# Patient Record
Sex: MALE | Race: WHITE | HISPANIC OR LATINO | URBAN - METROPOLITAN AREA
[De-identification: names, ages, dates, MRNs, and addresses within clinical notes are randomized per-mention and may not be internally consistent; named-entity substitution may affect disease eponyms.]

---
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
## 2023-12-06 ENCOUNTER — EMERGENCY (EMERGENCY)
Facility: HOSPITAL | Age: 23
LOS: 1 days | Discharge: HOME | End: 2023-12-06
Attending: INTERNAL MEDICINE | Admitting: STUDENT IN AN ORGANIZED HEALTH CARE EDUCATION/TRAINING PROGRAM
Payer: COMMERCIAL

## 2023-12-06 VITALS
RESPIRATION RATE: 28 BRPM | OXYGEN SATURATION: 98 % | HEIGHT: 67 IN | SYSTOLIC BLOOD PRESSURE: 114 MMHG | TEMPERATURE: 98 F | HEART RATE: 121 BPM | WEIGHT: 199.96 LBS | DIASTOLIC BLOOD PRESSURE: 67 MMHG

## 2023-12-06 LAB
ALBUMIN SERPL ELPH-MCNC: 4.2 G/DL — SIGNIFICANT CHANGE UP (ref 3.3–5)
ALBUMIN SERPL ELPH-MCNC: 4.2 G/DL — SIGNIFICANT CHANGE UP (ref 3.3–5)
ALP SERPL-CCNC: 102 U/L — SIGNIFICANT CHANGE UP (ref 40–120)
ALP SERPL-CCNC: 102 U/L — SIGNIFICANT CHANGE UP (ref 40–120)
ALT FLD-CCNC: 156 U/L — HIGH (ref 10–45)
ALT FLD-CCNC: 156 U/L — HIGH (ref 10–45)
ANION GAP SERPL CALC-SCNC: 10 MMOL/L — SIGNIFICANT CHANGE UP (ref 5–17)
ANION GAP SERPL CALC-SCNC: 10 MMOL/L — SIGNIFICANT CHANGE UP (ref 5–17)
AST SERPL-CCNC: 54 U/L — HIGH (ref 10–40)
AST SERPL-CCNC: 54 U/L — HIGH (ref 10–40)
BASOPHILS # BLD AUTO: 0.03 K/UL — SIGNIFICANT CHANGE UP (ref 0–0.2)
BASOPHILS # BLD AUTO: 0.03 K/UL — SIGNIFICANT CHANGE UP (ref 0–0.2)
BASOPHILS NFR BLD AUTO: 0.2 % — SIGNIFICANT CHANGE UP (ref 0–2)
BASOPHILS NFR BLD AUTO: 0.2 % — SIGNIFICANT CHANGE UP (ref 0–2)
BILIRUB SERPL-MCNC: 1.3 MG/DL — HIGH (ref 0.2–1.2)
BILIRUB SERPL-MCNC: 1.3 MG/DL — HIGH (ref 0.2–1.2)
BUN SERPL-MCNC: 13 MG/DL — SIGNIFICANT CHANGE UP (ref 7–23)
BUN SERPL-MCNC: 13 MG/DL — SIGNIFICANT CHANGE UP (ref 7–23)
CALCIUM SERPL-MCNC: 9.2 MG/DL — SIGNIFICANT CHANGE UP (ref 8.4–10.5)
CALCIUM SERPL-MCNC: 9.2 MG/DL — SIGNIFICANT CHANGE UP (ref 8.4–10.5)
CHLORIDE SERPL-SCNC: 102 MMOL/L — SIGNIFICANT CHANGE UP (ref 96–108)
CHLORIDE SERPL-SCNC: 102 MMOL/L — SIGNIFICANT CHANGE UP (ref 96–108)
CO2 SERPL-SCNC: 27 MMOL/L — SIGNIFICANT CHANGE UP (ref 22–31)
CO2 SERPL-SCNC: 27 MMOL/L — SIGNIFICANT CHANGE UP (ref 22–31)
CREAT SERPL-MCNC: 0.99 MG/DL — SIGNIFICANT CHANGE UP (ref 0.5–1.3)
CREAT SERPL-MCNC: 0.99 MG/DL — SIGNIFICANT CHANGE UP (ref 0.5–1.3)
EGFR: 111 ML/MIN/1.73M2 — SIGNIFICANT CHANGE UP
EGFR: 111 ML/MIN/1.73M2 — SIGNIFICANT CHANGE UP
EOSINOPHIL # BLD AUTO: 0.07 K/UL — SIGNIFICANT CHANGE UP (ref 0–0.5)
EOSINOPHIL # BLD AUTO: 0.07 K/UL — SIGNIFICANT CHANGE UP (ref 0–0.5)
EOSINOPHIL NFR BLD AUTO: 0.5 % — SIGNIFICANT CHANGE UP (ref 0–6)
EOSINOPHIL NFR BLD AUTO: 0.5 % — SIGNIFICANT CHANGE UP (ref 0–6)
GLUCOSE SERPL-MCNC: 111 MG/DL — HIGH (ref 70–99)
GLUCOSE SERPL-MCNC: 111 MG/DL — HIGH (ref 70–99)
HCT VFR BLD CALC: 45.9 % — SIGNIFICANT CHANGE UP (ref 39–50)
HCT VFR BLD CALC: 45.9 % — SIGNIFICANT CHANGE UP (ref 39–50)
HGB BLD-MCNC: 16.3 G/DL — SIGNIFICANT CHANGE UP (ref 13–17)
HGB BLD-MCNC: 16.3 G/DL — SIGNIFICANT CHANGE UP (ref 13–17)
IMM GRANULOCYTES NFR BLD AUTO: 0.5 % — SIGNIFICANT CHANGE UP (ref 0–0.9)
IMM GRANULOCYTES NFR BLD AUTO: 0.5 % — SIGNIFICANT CHANGE UP (ref 0–0.9)
LIDOCAIN IGE QN: 25 U/L — SIGNIFICANT CHANGE UP (ref 16–77)
LIDOCAIN IGE QN: 25 U/L — SIGNIFICANT CHANGE UP (ref 16–77)
LYMPHOCYTES # BLD AUTO: 0.69 K/UL — LOW (ref 1–3.3)
LYMPHOCYTES # BLD AUTO: 0.69 K/UL — LOW (ref 1–3.3)
LYMPHOCYTES # BLD AUTO: 5.3 % — LOW (ref 13–44)
LYMPHOCYTES # BLD AUTO: 5.3 % — LOW (ref 13–44)
MCHC RBC-ENTMCNC: 29.1 PG — SIGNIFICANT CHANGE UP (ref 27–34)
MCHC RBC-ENTMCNC: 29.1 PG — SIGNIFICANT CHANGE UP (ref 27–34)
MCHC RBC-ENTMCNC: 35.5 GM/DL — SIGNIFICANT CHANGE UP (ref 32–36)
MCHC RBC-ENTMCNC: 35.5 GM/DL — SIGNIFICANT CHANGE UP (ref 32–36)
MCV RBC AUTO: 82 FL — SIGNIFICANT CHANGE UP (ref 80–100)
MCV RBC AUTO: 82 FL — SIGNIFICANT CHANGE UP (ref 80–100)
MONOCYTES # BLD AUTO: 0.65 K/UL — SIGNIFICANT CHANGE UP (ref 0–0.9)
MONOCYTES # BLD AUTO: 0.65 K/UL — SIGNIFICANT CHANGE UP (ref 0–0.9)
MONOCYTES NFR BLD AUTO: 5 % — SIGNIFICANT CHANGE UP (ref 2–14)
MONOCYTES NFR BLD AUTO: 5 % — SIGNIFICANT CHANGE UP (ref 2–14)
NEUTROPHILS # BLD AUTO: 11.46 K/UL — HIGH (ref 1.8–7.4)
NEUTROPHILS # BLD AUTO: 11.46 K/UL — HIGH (ref 1.8–7.4)
NEUTROPHILS NFR BLD AUTO: 88.5 % — HIGH (ref 43–77)
NEUTROPHILS NFR BLD AUTO: 88.5 % — HIGH (ref 43–77)
NRBC # BLD: 0 /100 WBCS — SIGNIFICANT CHANGE UP (ref 0–0)
NRBC # BLD: 0 /100 WBCS — SIGNIFICANT CHANGE UP (ref 0–0)
PLATELET # BLD AUTO: 226 K/UL — SIGNIFICANT CHANGE UP (ref 150–400)
PLATELET # BLD AUTO: 226 K/UL — SIGNIFICANT CHANGE UP (ref 150–400)
POTASSIUM SERPL-MCNC: 4 MMOL/L — SIGNIFICANT CHANGE UP (ref 3.5–5.3)
POTASSIUM SERPL-MCNC: 4 MMOL/L — SIGNIFICANT CHANGE UP (ref 3.5–5.3)
POTASSIUM SERPL-SCNC: 4 MMOL/L — SIGNIFICANT CHANGE UP (ref 3.5–5.3)
POTASSIUM SERPL-SCNC: 4 MMOL/L — SIGNIFICANT CHANGE UP (ref 3.5–5.3)
PROT SERPL-MCNC: 8.1 G/DL — SIGNIFICANT CHANGE UP (ref 6–8.3)
PROT SERPL-MCNC: 8.1 G/DL — SIGNIFICANT CHANGE UP (ref 6–8.3)
RAPID RVP RESULT: SIGNIFICANT CHANGE UP
RAPID RVP RESULT: SIGNIFICANT CHANGE UP
RBC # BLD: 5.6 M/UL — SIGNIFICANT CHANGE UP (ref 4.2–5.8)
RBC # BLD: 5.6 M/UL — SIGNIFICANT CHANGE UP (ref 4.2–5.8)
RBC # FLD: 12.1 % — SIGNIFICANT CHANGE UP (ref 10.3–14.5)
RBC # FLD: 12.1 % — SIGNIFICANT CHANGE UP (ref 10.3–14.5)
SARS-COV-2 RNA SPEC QL NAA+PROBE: SIGNIFICANT CHANGE UP
SARS-COV-2 RNA SPEC QL NAA+PROBE: SIGNIFICANT CHANGE UP
SODIUM SERPL-SCNC: 139 MMOL/L — SIGNIFICANT CHANGE UP (ref 135–145)
SODIUM SERPL-SCNC: 139 MMOL/L — SIGNIFICANT CHANGE UP (ref 135–145)
WBC # BLD: 12.97 K/UL — HIGH (ref 3.8–10.5)
WBC # BLD: 12.97 K/UL — HIGH (ref 3.8–10.5)
WBC # FLD AUTO: 12.97 K/UL — HIGH (ref 3.8–10.5)
WBC # FLD AUTO: 12.97 K/UL — HIGH (ref 3.8–10.5)

## 2023-12-06 PROCEDURE — 99203 OFFICE O/P NEW LOW 30 MIN: CPT

## 2023-12-06 PROCEDURE — 74177 CT ABD & PELVIS W/CONTRAST: CPT | Mod: 26,MA

## 2023-12-06 PROCEDURE — 93010 ELECTROCARDIOGRAM REPORT: CPT

## 2023-12-06 PROCEDURE — 76705 ECHO EXAM OF ABDOMEN: CPT | Mod: 26

## 2023-12-06 PROCEDURE — 99222 1ST HOSP IP/OBS MODERATE 55: CPT

## 2023-12-06 PROCEDURE — 99285 EMERGENCY DEPT VISIT HI MDM: CPT

## 2023-12-06 RX ORDER — ONDANSETRON 8 MG/1
4 TABLET, FILM COATED ORAL EVERY 8 HOURS
Refills: 0 | Status: DISCONTINUED | OUTPATIENT
Start: 2023-12-06 | End: 2023-12-09

## 2023-12-06 RX ORDER — SODIUM CHLORIDE 9 MG/ML
1000 INJECTION INTRAMUSCULAR; INTRAVENOUS; SUBCUTANEOUS ONCE
Refills: 0 | Status: COMPLETED | OUTPATIENT
Start: 2023-12-06 | End: 2023-12-06

## 2023-12-06 RX ORDER — ACETAMINOPHEN 500 MG
650 TABLET ORAL EVERY 6 HOURS
Refills: 0 | Status: DISCONTINUED | OUTPATIENT
Start: 2023-12-06 | End: 2023-12-09

## 2023-12-06 RX ORDER — PANTOPRAZOLE SODIUM 20 MG/1
40 TABLET, DELAYED RELEASE ORAL
Refills: 0 | Status: DISCONTINUED | OUTPATIENT
Start: 2023-12-06 | End: 2023-12-09

## 2023-12-06 RX ORDER — PIPERACILLIN AND TAZOBACTAM 4; .5 G/20ML; G/20ML
3.38 INJECTION, POWDER, LYOPHILIZED, FOR SOLUTION INTRAVENOUS ONCE
Refills: 0 | Status: COMPLETED | OUTPATIENT
Start: 2023-12-06 | End: 2023-12-06

## 2023-12-06 RX ORDER — MORPHINE SULFATE 50 MG/1
2 CAPSULE, EXTENDED RELEASE ORAL EVERY 6 HOURS
Refills: 0 | Status: DISCONTINUED | OUTPATIENT
Start: 2023-12-06 | End: 2023-12-06

## 2023-12-06 RX ORDER — PIPERACILLIN AND TAZOBACTAM 4; .5 G/20ML; G/20ML
3.38 INJECTION, POWDER, LYOPHILIZED, FOR SOLUTION INTRAVENOUS EVERY 8 HOURS
Refills: 0 | Status: DISCONTINUED | OUTPATIENT
Start: 2023-12-06 | End: 2023-12-09

## 2023-12-06 RX ORDER — ONDANSETRON 8 MG/1
4 TABLET, FILM COATED ORAL ONCE
Refills: 0 | Status: COMPLETED | OUTPATIENT
Start: 2023-12-06 | End: 2023-12-06

## 2023-12-06 RX ORDER — ACETAMINOPHEN 500 MG
1000 TABLET ORAL ONCE
Refills: 0 | Status: COMPLETED | OUTPATIENT
Start: 2023-12-06 | End: 2023-12-06

## 2023-12-06 RX ORDER — SODIUM CHLORIDE 9 MG/ML
1000 INJECTION, SOLUTION INTRAVENOUS
Refills: 0 | Status: DISCONTINUED | OUTPATIENT
Start: 2023-12-06 | End: 2023-12-06

## 2023-12-06 RX ORDER — INFLUENZA VIRUS VACCINE 15; 15; 15; 15 UG/.5ML; UG/.5ML; UG/.5ML; UG/.5ML
0.5 SUSPENSION INTRAMUSCULAR ONCE
Refills: 0 | Status: DISCONTINUED | OUTPATIENT
Start: 2023-12-06 | End: 2023-12-09

## 2023-12-06 RX ORDER — FAMOTIDINE 10 MG/ML
20 INJECTION INTRAVENOUS ONCE
Refills: 0 | Status: COMPLETED | OUTPATIENT
Start: 2023-12-06 | End: 2023-12-06

## 2023-12-06 RX ADMIN — FAMOTIDINE 20 MILLIGRAM(S): 10 INJECTION INTRAVENOUS at 02:05

## 2023-12-06 RX ADMIN — PIPERACILLIN AND TAZOBACTAM 25 GRAM(S): 4; .5 INJECTION, POWDER, LYOPHILIZED, FOR SOLUTION INTRAVENOUS at 11:32

## 2023-12-06 RX ADMIN — ONDANSETRON 4 MILLIGRAM(S): 8 TABLET, FILM COATED ORAL at 06:32

## 2023-12-06 RX ADMIN — Medication 400 MILLIGRAM(S): at 03:54

## 2023-12-06 RX ADMIN — SODIUM CHLORIDE 100 MILLILITER(S): 9 INJECTION, SOLUTION INTRAVENOUS at 06:04

## 2023-12-06 RX ADMIN — ONDANSETRON 4 MILLIGRAM(S): 8 TABLET, FILM COATED ORAL at 01:34

## 2023-12-06 RX ADMIN — Medication 650 MILLIGRAM(S): at 11:36

## 2023-12-06 RX ADMIN — FAMOTIDINE 100 MILLIGRAM(S): 10 INJECTION INTRAVENOUS at 01:33

## 2023-12-06 RX ADMIN — PANTOPRAZOLE SODIUM 40 MILLIGRAM(S): 20 TABLET, DELAYED RELEASE ORAL at 06:04

## 2023-12-06 RX ADMIN — PIPERACILLIN AND TAZOBACTAM 200 GRAM(S): 4; .5 INJECTION, POWDER, LYOPHILIZED, FOR SOLUTION INTRAVENOUS at 04:36

## 2023-12-06 RX ADMIN — MORPHINE SULFATE 2 MILLIGRAM(S): 50 CAPSULE, EXTENDED RELEASE ORAL at 06:28

## 2023-12-06 RX ADMIN — Medication 1000 MILLIGRAM(S): at 04:09

## 2023-12-06 RX ADMIN — Medication 650 MILLIGRAM(S): at 12:06

## 2023-12-06 RX ADMIN — SODIUM CHLORIDE 1000 MILLILITER(S): 9 INJECTION INTRAMUSCULAR; INTRAVENOUS; SUBCUTANEOUS at 01:33

## 2023-12-06 RX ADMIN — SODIUM CHLORIDE 1000 MILLILITER(S): 9 INJECTION INTRAMUSCULAR; INTRAVENOUS; SUBCUTANEOUS at 02:33

## 2023-12-06 RX ADMIN — MORPHINE SULFATE 2 MILLIGRAM(S): 50 CAPSULE, EXTENDED RELEASE ORAL at 06:58

## 2023-12-06 RX ADMIN — PIPERACILLIN AND TAZOBACTAM 25 GRAM(S): 4; .5 INJECTION, POWDER, LYOPHILIZED, FOR SOLUTION INTRAVENOUS at 19:10

## 2023-12-06 RX ADMIN — Medication 1000 MILLIGRAM(S): at 04:24

## 2023-12-06 RX ADMIN — PIPERACILLIN AND TAZOBACTAM 3.38 GRAM(S): 4; .5 INJECTION, POWDER, LYOPHILIZED, FOR SOLUTION INTRAVENOUS at 05:18

## 2023-12-06 NOTE — CONSULT NOTE ADULT - ASSESSMENT
IMPRESSION: Abdominal pain - etiology unclear    PLAN: Ultrasound to R/O  hepatobiliary tract disease            Recommend empiric antibiotics for possible early, uncomplicated appendicitis            No indication of any surgical problem at this time            Begin PO fluids

## 2023-12-06 NOTE — H&P ADULT - NSHPPHYSICALEXAM_GEN_ALL_CORE
Vital Signs Last 24 Hrs  T(C): 36.7 (06 Dec 2023 05:00), Max: 36.7 (06 Dec 2023 05:00)  T(F): 98 (06 Dec 2023 05:00), Max: 98 (06 Dec 2023 05:00)  HR: 98 (06 Dec 2023 05:00) (98 - 121)  BP: 126/76 (06 Dec 2023 05:00) (114/67 - 126/76)  BP(mean): --  RR: 20 (06 Dec 2023 05:00) (20 - 28)  SpO2: 98% (06 Dec 2023 05:00) (98% - 98%)    Parameters below as of 06 Dec 2023 05:00  Patient On (Oxygen Delivery Method): room air      Daily Height in cm: 170.18 (06 Dec 2023 01:01)    Daily   CAPILLARY BLOOD GLUCOSE        I&O's Summary      GENERAL: NAD  HEAD:  Normocephalic  EYES: EOMI, PERRLA, conjunctiva and sclera clear  ENMT: No tonsillar erythema, exudates, or enlargement; Moist mucous membranes, No lesions  NECK: Supple, No JVD, no bruit, normal thyroid  NERVOUS SYSTEM:  Alert & Oriented X3, Good concentration; grossly  Motor Strength 5/5 B/L upper and lower extremities; DTRs 2+ intact and symmetric  CHEST/LUNG: Clear to auscultation bilaterally; No rales, rhonchi, wheezing, or rubs  HEART: Regular rate and rhythm; No murmurs, rubs, or gallops  ABDOMEN: Soft, epigastric and RUQ tenderness and R LQ tenderness on palp, Nondistended; Bowel sounds present. no ellie or rigidity  EXTREMITIES:  2+ Peripheral Pulses, No clubbing, cyanosis, or edema  LYMPH: No lymphadenopathy noted  SKIN: No rashes or lesions

## 2023-12-06 NOTE — H&P ADULT - NSICDXFAMILYHX_GEN_ALL_CORE_FT
FAMILY HISTORY:  Grandparent  Still living? Unknown  FH: HTN (hypertension), Age at diagnosis: Age Unknown  FHx: diabetes mellitus, Age at diagnosis: Age Unknown

## 2023-12-06 NOTE — ED ADULT NURSE REASSESSMENT NOTE - NS ED NURSE REASSESS COMMENT FT1
Called dietary for tray as patient was upgraded to clear liquid diet. Will try to send a tray or an early lunch tray. Patient ambulated to restroom with steady gait noted. Family at bedside. Needs met, educated to keep arm with continuous fluids straight. Patient given clear liquids that are in ER.

## 2023-12-06 NOTE — ED ADULT NURSE NOTE - CADM POA URETHRAL CATHETER
No
History of prostate cancer diagnosed many years ago on active chemo. Denies RT or surgeries. [facility] Oneil. Based on imaging has diffuse metastatic lesions to the bone.   - He is unaware that it is metastatic ideally should speak to wife for more collateral information before informing the patient  - Should speak to his Oncologist of his current status  - Might be the explanation for his weakness?

## 2023-12-06 NOTE — ED PROVIDER NOTE - NSICDXPASTSURGICALHX_GEN_ALL_CORE_FT
PAST SURGICAL HISTORY:  Circumcision     Inguinal hernia bilateral, non-recurrent repaired at 3 mos of life @ [facility]     PAST SURGICAL HISTORY:  Circumcision     Inguinal hernia bilateral, non-recurrent repaired at 3 mos of life @ [facility]

## 2023-12-06 NOTE — ED ADULT NURSE NOTE - OBJECTIVE STATEMENT
Patient arrives A&OX4 and ambulatory with steady gait. Patient states since 1600, has had N+V+D. Patient states he attempted to rest to help symptoms but no relief. Patient continued to vomit. Patient without fever, chills, sick contact. Abd cramping is diffuse in nature. 20G IV placed to RFA and labs drawn as ordered. MD at bedside for eval. Side rails up, call light in reach, safety maintained Patient arrives A&OX4 and ambulatory with steady gait. Patient states since 1600, has had N+V+D. Patient states he attempted to rest to help symptoms but no relief. Patient continued to vomit. Patient without fever, chills, sick contact. Abd cramping is diffuse in nature. 20G IV placed to RAC and labs drawn as ordered. MD at bedside for eval. Side rails up, call light in reach, safety maintained

## 2023-12-06 NOTE — H&P ADULT - NSHPLABSRESULTS_GEN_ALL_CORE
< from: CT Abdomen and Pelvis w/ IV Cont (12.06.23 @ 03:21) >    FINDINGS:  LOWER CHEST: Within normal limits.    LIVER: Steatosis.  BILE DUCTS: Normal caliber.  GALLBLADDER: Within normal limits.  SPLEEN: Within normal limits.  PANCREAS: Within normal limits.  ADRENALS: Within normal limits.  KIDNEYS/URETERS: Within normal limits.    BLADDER: Within normal limits.  REPRODUCTIVE ORGANS: Normal-sized prostate.    BOWEL: No bowel obstruction. Appendix is slightly prominent up to 7 mm.   No periappendiceal inflammation.  PERITONEUM: No ascites.  VESSELS: Within normal limits.  RETROPERITONEUM/LYMPH NODES: No lymphadenopathy.  ABDOMINAL WALL: Tiny fat-containing umbilical hernia.  BONES: Within normal limits.    IMPRESSION:  No bowel obstruction or evidence of colitis.  Slightly prominent appendix up to 7 mm without associated periappendiceal   inflammation.      < end of copied text >

## 2023-12-06 NOTE — ED PROVIDER NOTE - PHYSICAL EXAMINATION
General:     NAD, well-nourished, well-appearing  Head:     NC/AT, EOMI, oral mucosa moist  Neck:     trachea midline  Lungs:     CTA b/l, no w/r/r  CVS:     S1S2, RRR, no m/g/r  Abd:     + Hyperactive bowel soundsBS, s/Right lower quadrant pain/nd, no organomegaly  Ext:    2+ radial and pedal pulses, no c/c/e  Neuro: AAOx3, no sensory/motor deficits

## 2023-12-06 NOTE — CONSULT NOTE ADULT - SUBJECTIVE AND OBJECTIVE BOX
This 23 year old man developed upper abdominal pain 24 hours ago upon awakening from sleep. He vomited several times as the pain became more generalized   The pain improved only to return several hours later. The patient presented to the ED last night where a CT scan demonstrated an appendix which was at the upper limit of normal diameter (7mm) without any inflammation. The patient experienced similar symptoms one week ago which quickly resolved after vomiting. The patient denied fever or chills.        PAST MEDICAL & SURGICAL HISTORY:  No pertinent past medical history.  No pertinent surgical procedures.    PFSH: All noncontributory    MEDICATIONS REVIEWED:acetaminophen     Tablet .. 650 milliGRAM(s) Oral every 6 hours PRN  lactated ringers. 1000 milliLiter(s) IV Continuous <Continuous>  morphine  - Injectable 2 milliGRAM(s) IV Push every 6 hours PRN  ondansetron Injectable 4 milliGRAM(s) IV Push every 8 hours PRN  pantoprazole    Tablet 40 milliGRAM(s) Oral before breakfast  piperacillin/tazobactam IVPB.. 3.375 Gram(s) IV Intermittent every 8 hours      ALLERGIES:penicillins (Rash)  amoxicillin (Other)      REVIEW OF SYSTEMS:  Comprehensive Review of Systems negative except as noted in HPI      PHYSICAL EXAMINATION:  RESPIRATORY: Clear to auscultation bilaterally, respirations non-labored.  CARDIAC: RR, normal S1S2, no murmurs.  ABDOMEN: Slight tenderness to DEEP palpation RUQ=RLQ=epigastrium, soft, BS present, no masses, no hernias, no peritoneal signs, no KLS,  VASCULAR: Equal and normal pulses.  MUSCULOSKELETAL: Full ROM, no deformities.      T(C): 37.6 (12-06-23 @ 05:45), Max: 37.6 (12-06-23 @ 05:45)  HR: 87 (12-06-23 @ 08:02) (87 - 121)  BP: 114/56 (12-06-23 @ 08:02) (114/56 - 126/76)  RR: 19 (12-06-23 @ 08:02) (19 - 28)  SpO2: 99% (12-06-23 @ 08:02) (97% - 99%)                          16.3   12.97 )-----------( 226      ( 06 Dec 2023 01:30 )             45.9       12-06    139  |  102  |  13  ----------------------------<  111<H>  4.0   |  27  |  0.99    Ca    9.2      06 Dec 2023 01:30    TPro  8.1  /  Alb  4.2  /  TBili  1.3<H>  /  DBili  x   /  AST  54<H>  /  ALT  156<H>  /  AlkPhos  102  12-06

## 2023-12-06 NOTE — H&P ADULT - NSHPREVIEWOFSYSTEMS_GEN_ALL_CORE
CONSTITUTIONAL: No fever, weight loss, or fatigue  EYES: No eye pain, visual disturbances, or discharge  ENMT:  No difficulty hearing, tinnitus, vertigo; No sinus or throat pain  NECK: No pain or stiffness  RESPIRATORY: No cough, wheezing, chills or hemoptysis; No shortness of breath  CARDIOVASCULAR: No chest pain, palpitations, dizziness, or leg swelling  GASTROINTESTINAL: + abdominal or epigastric pain. + nausea, vomiting, no  hematemesis; ++ diarrhea. No melena or hematochezia.  GENITOURINARY: No dysuria, frequency, hematuria, or incontinence  NEUROLOGICAL: No headaches, memory loss, loss of strength, numbness, or tremors  SKIN: No itching, burning, rashes, or lesions   LYMPH NODES: No enlarged glands  ENDOCRINE: No heat or cold intolerance; No hair loss  MUSCULOSKELETAL: No joint pain or swelling; No muscle, back, or extremity pain  PSYCHIATRIC: No depression, anxiety, mood swings, or difficulty sleeping  HEME/LYMPH: No easy bruising, or bleeding gums  ALLERY AND IMMUNOLOGIC: No hives or eczema    IMPROVE VTE Individual Risk Assessment          RISK                                                          Points  [  ] Previous VTE                                                3  [  ] Thrombophilia                                             2  [  ] Lower limb paralysis                                   2        (unable to hold up >15 seconds)    [  ] Current Cancer                                             2         (within 6 months)  [  ] Immobilization > 24 hrs                              1  [  ] ICU/CCU stay > 24 hours                             1  [  ] Age > 60                                                         1    IMPROVE VTE Score:         [    0     ]    Total Risk Factor Score:    0 - 1:   Consider IPC  >2 - 3:  Thromboprophylaxis required (enoxaparin or SQ heparin)        >4:   High Risk: Thromboprophylaxis required (enoxaparin or SQ heparin), optional add IPC  **If CONTRAINDICATION to enoxaparin or SQ heparin, USE IPCs**

## 2023-12-06 NOTE — PATIENT PROFILE ADULT - FUNCTIONAL ASSESSMENT - BASIC MOBILITY 6.
4-calculated by average/Not able to assess (calculate score using [facility] averaging method)  4-calculated by average/Not able to assess (calculate score using [facility] averaging method)

## 2023-12-06 NOTE — ED ADULT NURSE NOTE - NSFALLUNIVINTERV_ED_ALL_ED
[FreeTextEntry1] : CPE [de-identified] : vaccine- got COVID vac\par diet- healthy, WW\par exercise- no since she hurt her knee\par Thyroid CA- being f/u by ENT -5/17/22, reviewed 2/24/22 US thyroid\par constipation- after thyroidectomy- better since taking probiotic- now back to nl\par pt was evaluated by fertility- restarting fertility tx now , endo- being f/u by Dr. Palmer- awaiting implantation after getting nuclear scan\par .  4/19 ECHO EF 55%, mild TR\par chronic back pain- no chg- did PT Bed/Stretcher in lowest position, wheels locked, appropriate side rails in place/Call bell, personal items and telephone in reach/Instruct patient to call for assistance before getting out of bed/chair/stretcher/Non-slip footwear applied when patient is off stretcher/[place] to call system/Physically safe environment - no spills, clutter or unnecessary equipment/Purposeful proactive rounding/Room/bathroom lighting operational, light cord in reach Bed/Stretcher in lowest position, wheels locked, appropriate side rails in place/Call bell, personal items and telephone in reach/Instruct patient to call for assistance before getting out of bed/chair/stretcher/Non-slip footwear applied when patient is off stretcher/[place] to call system/Physically safe environment - no spills, clutter or unnecessary equipment/Purposeful proactive rounding/Room/bathroom lighting operational, light cord in reach

## 2023-12-06 NOTE — PROGRESS NOTE ADULT - SUBJECTIVE AND OBJECTIVE BOX
Patient is a 23y old  Male who presents with a chief complaint of RUQ pain, RLQ pain r/o appy (06 Dec 2023 09:05)      Patient seen and examined at bedside. Admitted last night for abd pain, r/o appendicitis. Patient at this time states abd pain still present but much improved since yesterday. Passed flatus yesterday but not today, no BM yet. Denies nausea or vomiting today. States abd pain more so center location but radiates. Has had GERD in the past but resolves quickly on its own.    ALLERGIES:  penicillins (Rash)  amoxicillin (Other)    MEDICATIONS  (STANDING):  lactated ringers. 1000 milliLiter(s) (100 mL/Hr) IV Continuous <Continuous>  pantoprazole    Tablet 40 milliGRAM(s) Oral before breakfast  piperacillin/tazobactam IVPB.. 3.375 Gram(s) IV Intermittent every 8 hours    MEDICATIONS  (PRN):  acetaminophen     Tablet .. 650 milliGRAM(s) Oral every 6 hours PRN Mild Pain (1 - 3)  morphine  - Injectable 2 milliGRAM(s) IV Push every 6 hours PRN Severe Pain (7 - 10)  ondansetron Injectable 4 milliGRAM(s) IV Push every 8 hours PRN Nausea and/or Vomiting    Vital Signs Last 24 Hrs  T(F): 100 (06 Dec 2023 11:41), Max: 100 (06 Dec 2023 11:18)  HR: 82 (06 Dec 2023 11:18) (82 - 121)  BP: 122/86 (06 Dec 2023 11:18) (114/56 - 126/76)  RR: 18 (06 Dec 2023 11:18) (18 - 28)  SpO2: 99% (06 Dec 2023 11:18) (97% - 99%)  I&O's Summary      PHYSICAL EXAM:  GENERAL: NAD, well-groomed, well-developed  HEAD:  Atraumatic, Normocephalic  EYES: PEERL, conjunctiva and sclera clear  ENMT: Moist mucous membranes, Good dentition, no thrush  NECK: Supple, No JVD  CHEST/LUNG: Clear to auscultation bilaterally, good air entry, non-labored breathing  HEART: RRR; S1/S2, No murmur  ABDOMEN: Soft, epigastric tenderness, mild discomfort to palpation L side, Nondistended; Bowel sounds present  EXTREMITIES: No calf tenderness, No cyanosis, No edema  SKIN: Warm, perfused  PSYCH: Normal mood, Normal affect  NERVOUS SYSTEM:  A/O x3, Good concentration    LABS:                        16.3   12.97 )-----------( 226      ( 06 Dec 2023 01:30 )             45.9     12-06    139  |  102  |  13  ----------------------------<  111  4.0   |  27  |  0.99    Ca    9.2      06 Dec 2023 01:30    TPro  8.1  /  Alb  4.2  /  TBili  1.3  /  DBili  x   /  AST  54  /  ALT  156  /  AlkPhos  102  12-06                                Urinalysis Basic - ( 06 Dec 2023 01:30 )    Color: x / Appearance: x / SG: x / pH: x  Gluc: 111 mg/dL / Ketone: x  / Bili: x / Urobili: x   Blood: x / Protein: x / Nitrite: x   Leuk Esterase: x / RBC: x / WBC x   Sq Epi: x / Non Sq Epi: x / Bacteria: x            RADIOLOGY & ADDITIONAL TESTS:    Care Discussed with Consultants/Other Providers:

## 2023-12-06 NOTE — H&P ADULT - HISTORY OF PRESENT ILLNESS
23 M no sig PMHx other than bl inguinal hernia repair pw abd pain with NVD. Pt related he had woke up yesterday with a mild upper epigastric abd discomfort which lasted briefly. Recurrent abd pain at around 3-4PM with mostly R sided abd pain subsequently followed with several episodes of watery diarrhea at about 6PM and later with vomiting x 3. Unable to tolerate po and increasing abd pain and came to ED. Denied any fevers, chills, URIs, cough, SOB, CP, dysuria or flank pain. +family member with similar sxs about a week ago that resolved after a day. Denied any recent antibx use.   In ED afebrile P: 121 BP: 134/67 sat 98% on RA.   WBC: 12.97 88%N hgb: 16.3  0.99 TB: 1.3 AP; 102 AST/ALT 54/156   CTAP:   < from: CT Abdomen and Pelvis w/ IV Cont (12.06.23 @ 03:21) >  IMPRESSION:  No bowel obstruction or evidence of colitis.  Slightly prominent appendix up to 7 mm without associated periappendiceal   inflammation.    < end of copied text >

## 2023-12-06 NOTE — ED PROVIDER NOTE - OBJECTIVE STATEMENT
23-year-old male came to the emergency room with chief complaint vomiting diarrhea denies any fever patient unable to tolerate p.o.

## 2023-12-06 NOTE — ED PROVIDER NOTE - CLINICAL SUMMARY MEDICAL DECISION MAKING FREE TEXT BOX
23-year-old with acute abdominal pain nausea vomiting tender in the right lower quadrant  Labs and CT IV fluids Pepcid Zofran 23-year-old with acute abdominal pain nausea vomiting tender in the right lower quadrant  Labs and CT IV fluids Pepcid Zofran   Patient's white count is 12.6 CT is consistent with dilated appendix patient continues to have right lower quadrant tenderness message sent to Dr. Soto surgery antibiotics started plan to admit the patient for observation

## 2023-12-06 NOTE — H&P ADULT - NSICDXPASTSURGICALHX_GEN_ALL_CORE_FT
PAST SURGICAL HISTORY:  Circumcision     Inguinal hernia bilateral, non-recurrent repaired at 3 mos of life @ [facility]     PAST SURGICAL HISTORY:  Circumcision     Inguinal hernia bilateral, non-recurrent repaired at 3 mos of life @ [facility]

## 2023-12-06 NOTE — H&P ADULT - ASSESSMENT
23 M no sig PMHx other than bl inguinal hernia repair pw abd pain with NVD.    #Dilated appendix r/o early appendicitis  cont IV Zosyn  Keep NPO  IVFs  anti-emetics.  Surgery consult    #Abn LFTs with RUQ/epigastric tenderness  CTAP neg for GB pathology  Check RUQ sono to be complete    #Diarrhea  check stool cxs, GI pcr, check RVP to be complete.     Grandmother at bedside updated.

## 2023-12-06 NOTE — PATIENT PROFILE ADULT - FALL HARM RISK - UNIVERSAL INTERVENTIONS
Bed in lowest position, wheels locked, appropriate side rails in place/Call bell, personal items and telephone in reach/Instruct patient to call for assistance before getting out of bed or chair/Non-slip footwear when patient is out of bed/[place] to call system/Physically safe environment - no spills, clutter or unnecessary equipment/Purposeful Proactive Rounding/Room/bathroom lighting operational, light cord in reach Bed in lowest position, wheels locked, appropriate side rails in place/Call bell, personal items and telephone in reach/Instruct patient to call for assistance before getting out of bed or chair/Non-slip footwear when patient is out of bed/[place] to call system/Physically safe environment - no spills, clutter or unnecessary equipment/Purposeful Proactive Rounding/Room/bathroom lighting operational, light cord in reach

## 2023-12-06 NOTE — ED ADULT NURSE REASSESSMENT NOTE - NS ED NURSE REASSESS COMMENT FT1
Patient alert ad oriented, breathing spontaneous and unlabored. Reports being tired. Call bell at bedside, needs met at this time. Morning labs drawn. Bed locked and in lowest position for safety. Patient alert and oriented, breathing spontaneous and unlabored. Reports being tired. Call bell at bedside, needs met at this time. Morning labs drawn. Bed locked and in lowest position for safety.

## 2023-12-06 NOTE — ED ADULT NURSE REASSESSMENT NOTE - NS ED NURSE REASSESS COMMENT FT1
Patient endorsing increase in pain at this time. MD Rodriguez made aware and awaiting further orders. Patient endorsing increase in pain at this time. No change in assessment. Vitals as per flowsheet. MD Rodriguez made aware and awaiting further orders.

## 2023-12-06 NOTE — PROGRESS NOTE ADULT - ASSESSMENT
23 year old M no PMH came in with abd pain admitted for r/o appendicitis.     #abd pain  - possible GERD vs early appendicitis  - continue zosyn for now  - advanced diet to CLD  - discussed with Dr. Soto- not concerning for acute appendicitis at this time. Will monitor for today and re-eval tomorrow for need for surgical intervention   - Will make NPO after MN in case    #transaminitis  - CT abd/pelvis negative for biliary pathology  - RUQ US with Hepatic steatosis. No cholelithiasis or biliary ductal dilatation.  - continue to monitor     #Diarrhea  - resolved  - RVP negative  - follow up stool culture, GI PCR, ova and parasites    #DVT ppx  - encourage ambulation    patient states he will update family on his own

## 2023-12-07 LAB
ALBUMIN SERPL ELPH-MCNC: 3.6 G/DL — SIGNIFICANT CHANGE UP (ref 3.3–5)
ALBUMIN SERPL ELPH-MCNC: 3.6 G/DL — SIGNIFICANT CHANGE UP (ref 3.3–5)
ALP SERPL-CCNC: 80 U/L — SIGNIFICANT CHANGE UP (ref 40–120)
ALP SERPL-CCNC: 80 U/L — SIGNIFICANT CHANGE UP (ref 40–120)
ALT FLD-CCNC: 124 U/L — HIGH (ref 10–45)
ALT FLD-CCNC: 124 U/L — HIGH (ref 10–45)
ANION GAP SERPL CALC-SCNC: 8 MMOL/L — SIGNIFICANT CHANGE UP (ref 5–17)
ANION GAP SERPL CALC-SCNC: 8 MMOL/L — SIGNIFICANT CHANGE UP (ref 5–17)
AST SERPL-CCNC: 44 U/L — HIGH (ref 10–40)
AST SERPL-CCNC: 44 U/L — HIGH (ref 10–40)
BASOPHILS # BLD AUTO: 0.02 K/UL — SIGNIFICANT CHANGE UP (ref 0–0.2)
BASOPHILS # BLD AUTO: 0.02 K/UL — SIGNIFICANT CHANGE UP (ref 0–0.2)
BASOPHILS NFR BLD AUTO: 0.3 % — SIGNIFICANT CHANGE UP (ref 0–2)
BASOPHILS NFR BLD AUTO: 0.3 % — SIGNIFICANT CHANGE UP (ref 0–2)
BILIRUB DIRECT SERPL-MCNC: 0.3 MG/DL — SIGNIFICANT CHANGE UP (ref 0–0.3)
BILIRUB DIRECT SERPL-MCNC: 0.3 MG/DL — SIGNIFICANT CHANGE UP (ref 0–0.3)
BILIRUB INDIRECT FLD-MCNC: 0.8 MG/DL — SIGNIFICANT CHANGE UP (ref 0.2–1)
BILIRUB INDIRECT FLD-MCNC: 0.8 MG/DL — SIGNIFICANT CHANGE UP (ref 0.2–1)
BILIRUB SERPL-MCNC: 1.1 MG/DL — SIGNIFICANT CHANGE UP (ref 0.2–1.2)
BUN SERPL-MCNC: 7 MG/DL — SIGNIFICANT CHANGE UP (ref 7–23)
BUN SERPL-MCNC: 7 MG/DL — SIGNIFICANT CHANGE UP (ref 7–23)
CALCIUM SERPL-MCNC: 9 MG/DL — SIGNIFICANT CHANGE UP (ref 8.4–10.5)
CALCIUM SERPL-MCNC: 9 MG/DL — SIGNIFICANT CHANGE UP (ref 8.4–10.5)
CHLORIDE SERPL-SCNC: 100 MMOL/L — SIGNIFICANT CHANGE UP (ref 96–108)
CHLORIDE SERPL-SCNC: 100 MMOL/L — SIGNIFICANT CHANGE UP (ref 96–108)
CO2 SERPL-SCNC: 31 MMOL/L — SIGNIFICANT CHANGE UP (ref 22–31)
CO2 SERPL-SCNC: 31 MMOL/L — SIGNIFICANT CHANGE UP (ref 22–31)
CREAT SERPL-MCNC: 1.13 MG/DL — SIGNIFICANT CHANGE UP (ref 0.5–1.3)
CREAT SERPL-MCNC: 1.13 MG/DL — SIGNIFICANT CHANGE UP (ref 0.5–1.3)
EGFR: 94 ML/MIN/1.73M2 — SIGNIFICANT CHANGE UP
EGFR: 94 ML/MIN/1.73M2 — SIGNIFICANT CHANGE UP
EOSINOPHIL # BLD AUTO: 0.15 K/UL — SIGNIFICANT CHANGE UP (ref 0–0.5)
EOSINOPHIL # BLD AUTO: 0.15 K/UL — SIGNIFICANT CHANGE UP (ref 0–0.5)
EOSINOPHIL NFR BLD AUTO: 2.4 % — SIGNIFICANT CHANGE UP (ref 0–6)
EOSINOPHIL NFR BLD AUTO: 2.4 % — SIGNIFICANT CHANGE UP (ref 0–6)
GLUCOSE SERPL-MCNC: 92 MG/DL — SIGNIFICANT CHANGE UP (ref 70–99)
GLUCOSE SERPL-MCNC: 92 MG/DL — SIGNIFICANT CHANGE UP (ref 70–99)
HCT VFR BLD CALC: 44.8 % — SIGNIFICANT CHANGE UP (ref 39–50)
HCT VFR BLD CALC: 44.8 % — SIGNIFICANT CHANGE UP (ref 39–50)
HGB BLD-MCNC: 15.3 G/DL — SIGNIFICANT CHANGE UP (ref 13–17)
HGB BLD-MCNC: 15.3 G/DL — SIGNIFICANT CHANGE UP (ref 13–17)
IMM GRANULOCYTES NFR BLD AUTO: 0.5 % — SIGNIFICANT CHANGE UP (ref 0–0.9)
IMM GRANULOCYTES NFR BLD AUTO: 0.5 % — SIGNIFICANT CHANGE UP (ref 0–0.9)
LIDOCAIN IGE QN: 29 U/L — SIGNIFICANT CHANGE UP (ref 16–77)
LIDOCAIN IGE QN: 29 U/L — SIGNIFICANT CHANGE UP (ref 16–77)
LYMPHOCYTES # BLD AUTO: 1.96 K/UL — SIGNIFICANT CHANGE UP (ref 1–3.3)
LYMPHOCYTES # BLD AUTO: 1.96 K/UL — SIGNIFICANT CHANGE UP (ref 1–3.3)
LYMPHOCYTES # BLD AUTO: 31.2 % — SIGNIFICANT CHANGE UP (ref 13–44)
LYMPHOCYTES # BLD AUTO: 31.2 % — SIGNIFICANT CHANGE UP (ref 13–44)
MCHC RBC-ENTMCNC: 28.8 PG — SIGNIFICANT CHANGE UP (ref 27–34)
MCHC RBC-ENTMCNC: 28.8 PG — SIGNIFICANT CHANGE UP (ref 27–34)
MCHC RBC-ENTMCNC: 34.2 GM/DL — SIGNIFICANT CHANGE UP (ref 32–36)
MCHC RBC-ENTMCNC: 34.2 GM/DL — SIGNIFICANT CHANGE UP (ref 32–36)
MCV RBC AUTO: 84.4 FL — SIGNIFICANT CHANGE UP (ref 80–100)
MCV RBC AUTO: 84.4 FL — SIGNIFICANT CHANGE UP (ref 80–100)
MONOCYTES # BLD AUTO: 0.77 K/UL — SIGNIFICANT CHANGE UP (ref 0–0.9)
MONOCYTES # BLD AUTO: 0.77 K/UL — SIGNIFICANT CHANGE UP (ref 0–0.9)
MONOCYTES NFR BLD AUTO: 12.2 % — SIGNIFICANT CHANGE UP (ref 2–14)
MONOCYTES NFR BLD AUTO: 12.2 % — SIGNIFICANT CHANGE UP (ref 2–14)
NEUTROPHILS # BLD AUTO: 3.36 K/UL — SIGNIFICANT CHANGE UP (ref 1.8–7.4)
NEUTROPHILS # BLD AUTO: 3.36 K/UL — SIGNIFICANT CHANGE UP (ref 1.8–7.4)
NEUTROPHILS NFR BLD AUTO: 53.4 % — SIGNIFICANT CHANGE UP (ref 43–77)
NEUTROPHILS NFR BLD AUTO: 53.4 % — SIGNIFICANT CHANGE UP (ref 43–77)
NRBC # BLD: 0 /100 WBCS — SIGNIFICANT CHANGE UP (ref 0–0)
NRBC # BLD: 0 /100 WBCS — SIGNIFICANT CHANGE UP (ref 0–0)
PLATELET # BLD AUTO: 200 K/UL — SIGNIFICANT CHANGE UP (ref 150–400)
PLATELET # BLD AUTO: 200 K/UL — SIGNIFICANT CHANGE UP (ref 150–400)
POTASSIUM SERPL-MCNC: 3.9 MMOL/L — SIGNIFICANT CHANGE UP (ref 3.5–5.3)
POTASSIUM SERPL-MCNC: 3.9 MMOL/L — SIGNIFICANT CHANGE UP (ref 3.5–5.3)
POTASSIUM SERPL-SCNC: 3.9 MMOL/L — SIGNIFICANT CHANGE UP (ref 3.5–5.3)
POTASSIUM SERPL-SCNC: 3.9 MMOL/L — SIGNIFICANT CHANGE UP (ref 3.5–5.3)
PROT SERPL-MCNC: 7.5 G/DL — SIGNIFICANT CHANGE UP (ref 6–8.3)
PROT SERPL-MCNC: 7.5 G/DL — SIGNIFICANT CHANGE UP (ref 6–8.3)
RBC # BLD: 5.31 M/UL — SIGNIFICANT CHANGE UP (ref 4.2–5.8)
RBC # BLD: 5.31 M/UL — SIGNIFICANT CHANGE UP (ref 4.2–5.8)
RBC # FLD: 12.5 % — SIGNIFICANT CHANGE UP (ref 10.3–14.5)
RBC # FLD: 12.5 % — SIGNIFICANT CHANGE UP (ref 10.3–14.5)
SODIUM SERPL-SCNC: 139 MMOL/L — SIGNIFICANT CHANGE UP (ref 135–145)
SODIUM SERPL-SCNC: 139 MMOL/L — SIGNIFICANT CHANGE UP (ref 135–145)
WBC # BLD: 6.29 K/UL — SIGNIFICANT CHANGE UP (ref 3.8–10.5)
WBC # BLD: 6.29 K/UL — SIGNIFICANT CHANGE UP (ref 3.8–10.5)
WBC # FLD AUTO: 6.29 K/UL — SIGNIFICANT CHANGE UP (ref 3.8–10.5)
WBC # FLD AUTO: 6.29 K/UL — SIGNIFICANT CHANGE UP (ref 3.8–10.5)

## 2023-12-07 PROCEDURE — 99233 SBSQ HOSP IP/OBS HIGH 50: CPT

## 2023-12-07 RX ADMIN — Medication 650 MILLIGRAM(S): at 09:45

## 2023-12-07 RX ADMIN — Medication 650 MILLIGRAM(S): at 08:52

## 2023-12-07 RX ADMIN — PIPERACILLIN AND TAZOBACTAM 25 GRAM(S): 4; .5 INJECTION, POWDER, LYOPHILIZED, FOR SOLUTION INTRAVENOUS at 10:46

## 2023-12-07 RX ADMIN — PANTOPRAZOLE SODIUM 40 MILLIGRAM(S): 20 TABLET, DELAYED RELEASE ORAL at 08:51

## 2023-12-07 RX ADMIN — PIPERACILLIN AND TAZOBACTAM 25 GRAM(S): 4; .5 INJECTION, POWDER, LYOPHILIZED, FOR SOLUTION INTRAVENOUS at 18:09

## 2023-12-07 RX ADMIN — PIPERACILLIN AND TAZOBACTAM 25 GRAM(S): 4; .5 INJECTION, POWDER, LYOPHILIZED, FOR SOLUTION INTRAVENOUS at 03:53

## 2023-12-07 NOTE — PROGRESS NOTE ADULT - SUBJECTIVE AND OBJECTIVE BOX
The patient is feeling well, as he is experiencing only mild epigastric pain. He denies denies nausea, vomiting, fever or chills. The patient is tolerating a clear fluid diet.       PAST MEDICAL & SURGICAL HISTORY:  No pertinent past medical history.  No pertinent surgical procedures    PFSH: All noncontributory    MEDICATIONS REVIEWED:acetaminophen     Tablet .. 650 milliGRAM(s) Oral every 6 hours PRN  influenza   Vaccine 0.5 milliLiter(s) IntraMuscular once  morphine  - Injectable 2 milliGRAM(s) IV Push every 6 hours PRN  ondansetron Injectable 4 milliGRAM(s) IV Push every 8 hours PRN  pantoprazole    Tablet 40 milliGRAM(s) Oral before breakfast  piperacillin/tazobactam IVPB.. 3.375 Gram(s) IV Intermittent every 8 hours      ALLERGIES:penicillins (Rash)  amoxicillin (Other)      REVIEW OF SYSTEMS:  Comprehensive Review of Systems negative except as noted in HPI      PHYSICAL EXAMINATION:  RESPIRATORY: Clear to auscultation bilaterally, respirations non-labored.  CARDIAC: RR, normal S1S2, no murmurs.  ABDOMEN: Slight tenderness in epigastrium only, soft, BS present, no masses, no hernias, no peritoneal signs, no KLS  VASCULAR: Equal and normal pulses.  MUSCULOSKELETAL: Full ROM, no deformities.      T(C): 36.8 (12-07-23 @ 18:19), Max: 36.8 (12-07-23 @ 18:19)  HR: 59 (12-07-23 @ 18:19) (59 - 75)  BP: 105/66 (12-07-23 @ 18:19) (94/57 - 129/72)  RR: 16 (12-07-23 @ 18:19) (16 - 18)  SpO2: 97% (12-07-23 @ 18:19) (97% - 99%)                          15.3   6.29  )-----------( 200      ( 07 Dec 2023 06:35 )             44.8       12-07    139  |  100  |  7   ----------------------------<  92  3.9   |  31  |  1.13    Ca    9.0      07 Dec 2023 06:35    TPro  7.5  /  Alb  3.6  /  TBili  1.1  /  DBili  0.3  /  AST  44<H>  /  ALT  124<H>  /  AlkPhos  80  12-07

## 2023-12-07 NOTE — PROGRESS NOTE ADULT - SUBJECTIVE AND OBJECTIVE BOX
23 M no sig PMHx other than bl inguinal hernia repair pw abd pain with NVD.    seen at the bedside, c/o RUQ AND FLANK PAIN, NO N/V THIS AM.    Vital Signs Last 24 Hrs  T(C): 36.6 (07 Dec 2023 06:30), Max: 37.8 (06 Dec 2023 11:18)  T(F): 97.9 (07 Dec 2023 06:30), Max: 100 (06 Dec 2023 11:18)  HR: 73 (07 Dec 2023 06:30) (72 - 85)  BP: 98/61 (07 Dec 2023 06:30) (94/57 - 122/86)  BP(mean): 74 (07 Dec 2023 06:30) (74 - 97)  RR: 18 (07 Dec 2023 06:30) (16 - 18)  SpO2: 99% (07 Dec 2023 06:30) (95% - 99%)    Parameters below as of 07 Dec 2023 06:30  Patient On (Oxygen Delivery Method): room air    GENERAL- NAD  EAR/NOSE/MOUTH/THROAT -  MMM  EYES- HERMINIA, conjunctiva and Sclera clear  NECK- supple  RESPIRATORY-  clear to auscultation bilaterally, non laboured breathing  CARDIOVASCULAR - SIS2, RRR  GI - soft tender RUQ, r flank, RLQ, BS present  EXTREMITIES- no pedal edema  NEUROLOGY- no gross focal deficits  PSYCHIATRY- AAO X 3                  15.3                 139  | 31   | 7            6.29  >-----------< 200     ------------------------< 92                    44.8                 3.9  | 100  | 1.13                                         Ca 9.0   Mg x     Ph x        Labs reviewed:      US Abdomen Upper Quadrant Right (12.06.23 @ 08:29) >  IMPRESSION:  Hepatic steatosis.  No cholelithiasis or biliary ductal dilatation.    < end of copied text >  < from: CT Abdomen and Pelvis w/ IV Cont (12.06.23 @ 03:21) >    IMPRESSION:  No bowel obstruction or evidence of colitis.  Slightly prominent appendix up to 7 mm without associated periappendiceal   inflammation.      ECG reviewed and interpreted: sinus tachy 102      MEDICATIONS  (STANDING):  influenza   Vaccine 0.5 milliLiter(s) IntraMuscular once  pantoprazole    Tablet 40 milliGRAM(s) Oral before breakfast  piperacillin/tazobactam IVPB.. 3.375 Gram(s) IV Intermittent every 8 hours    MEDICATIONS  (PRN):  acetaminophen     Tablet .. 650 milliGRAM(s) Oral every 6 hours PRN Mild Pain (1 - 3)  morphine  - Injectable 2 milliGRAM(s) IV Push every 6 hours PRN Severe Pain (7 - 10)  ondansetron Injectable 4 milliGRAM(s) IV Push every 8 hours PRN Nausea and/or Vomiting

## 2023-12-07 NOTE — PROGRESS NOTE ADULT - ASSESSMENT
IMPRESSION: Abdominal pain - doubt appendicitis or any other surgical problem    PLAN: Advance diet           Probable discharge in am           Appointment in my office next week           No further antibiotics needed

## 2023-12-07 NOTE — PROGRESS NOTE ADULT - ASSESSMENT
23 M PMHx bl inguinal hernia repair now c/o right sided abd pain with NVD x 1-2 days .    # RUQ, RLQ pain and tenderness likely due to  Dilated appendix r/o early appendicitis  mild leukocytosis resolved  cont IV Zosyn  Keep NPO  IVFs  anti-emetics.  Surgery consult- pending    #Abn LFTs with RUQ/epigastric tenderness  CTAP neg for GB pathology  Check RUQ sono - as above    #Diarrhea  check stool cxs, GI pcr, check RVP negative    will follow  dispo- home when medically stable   time spent in e/m visit- 50 min  23 M PMHx bl inguinal hernia repair now c/o right sided abd pain with NVD x 1-2 days .    # RUQ, RLQ pain and tenderness likely due to  Dilated appendix r/o early appendicitis  mild leukocytosis resolved  cont IV Zosyn for another 24 hr reassess in am   IVFs  anti-emetics.  Surgery consult- no plans for surgery, advance diet   pain control    #Abn LFTs with RUQ/epigastric tenderness  CTAP neg for GB pathology  Check RUQ sono - as above    #Diarrhea  check stool cxs, GI pcr, check RVP negative    will follow  dispo- home reassess in am , likely d/c if tolerating diet and ok with surgery   time spent in e/m visit- 50 min  23 M PMHx bl inguinal hernia repair now c/o right sided abd pain with NVD x 1-2 days .    # RUQ, RLQ pain and tenderness likely due to  Dilated appendix r/o early appendicitis  mild leukocytosis resolved  cont IV Zosyn for another 24 hr reassess in am   IVFs  anti-emetics.  Surgery consult- no plans for surgery, advance diet   pain control    #Abn LFTs with RUQ/epigastric tenderness  CTAP neg for GB pathology  Check RUQ sono - as above    #Diarrhea  check stool cxs, GI pcr, check RVP negative    will follow  dispo- home reassess in am , likely d/c if tolerating diet and ok with surgery   spoke with mom at the bedside, she is concerned about pt going home and falling sick again- explained we will c/w medial management for another 24 hrs and reassess in am.    time spent in e/m visit- 55 min

## 2023-12-08 ENCOUNTER — TRANSCRIPTION ENCOUNTER (OUTPATIENT)
Age: 23
End: 2023-12-08

## 2023-12-08 VITALS
DIASTOLIC BLOOD PRESSURE: 66 MMHG | TEMPERATURE: 98 F | HEART RATE: 79 BPM | SYSTOLIC BLOOD PRESSURE: 120 MMHG | OXYGEN SATURATION: 98 % | RESPIRATION RATE: 16 BRPM

## 2023-12-08 PROCEDURE — G0378: CPT

## 2023-12-08 PROCEDURE — 76705 ECHO EXAM OF ABDOMEN: CPT

## 2023-12-08 PROCEDURE — 74177 CT ABD & PELVIS W/CONTRAST: CPT | Mod: MA

## 2023-12-08 PROCEDURE — 96376 TX/PRO/DX INJ SAME DRUG ADON: CPT

## 2023-12-08 PROCEDURE — 83690 ASSAY OF LIPASE: CPT

## 2023-12-08 PROCEDURE — 0225U NFCT DS DNA&RNA 21 SARSCOV2: CPT

## 2023-12-08 PROCEDURE — 80053 COMPREHEN METABOLIC PANEL: CPT

## 2023-12-08 PROCEDURE — 85025 COMPLETE CBC W/AUTO DIFF WBC: CPT

## 2023-12-08 PROCEDURE — 96375 TX/PRO/DX INJ NEW DRUG ADDON: CPT

## 2023-12-08 PROCEDURE — 36415 COLL VENOUS BLD VENIPUNCTURE: CPT

## 2023-12-08 PROCEDURE — 96361 HYDRATE IV INFUSION ADD-ON: CPT

## 2023-12-08 PROCEDURE — 99239 HOSP IP/OBS DSCHRG MGMT >30: CPT

## 2023-12-08 PROCEDURE — 82247 BILIRUBIN TOTAL: CPT

## 2023-12-08 PROCEDURE — 82248 BILIRUBIN DIRECT: CPT

## 2023-12-08 PROCEDURE — 93005 ELECTROCARDIOGRAM TRACING: CPT

## 2023-12-08 PROCEDURE — 96365 THER/PROPH/DIAG IV INF INIT: CPT | Mod: XU

## 2023-12-08 PROCEDURE — 99284 EMERGENCY DEPT VISIT MOD MDM: CPT | Mod: 25

## 2023-12-08 RX ORDER — ACETAMINOPHEN 500 MG
2 TABLET ORAL
Qty: 0 | Refills: 0 | DISCHARGE
Start: 2023-12-08

## 2023-12-08 RX ADMIN — PIPERACILLIN AND TAZOBACTAM 25 GRAM(S): 4; .5 INJECTION, POWDER, LYOPHILIZED, FOR SOLUTION INTRAVENOUS at 03:44

## 2023-12-08 RX ADMIN — PIPERACILLIN AND TAZOBACTAM 25 GRAM(S): 4; .5 INJECTION, POWDER, LYOPHILIZED, FOR SOLUTION INTRAVENOUS at 11:17

## 2023-12-08 RX ADMIN — PANTOPRAZOLE SODIUM 40 MILLIGRAM(S): 20 TABLET, DELAYED RELEASE ORAL at 06:25

## 2023-12-08 NOTE — DISCHARGE NOTE PROVIDER - CARE PROVIDER_API CALL
Bay Soto.  Surgery  [address], [address]  [place] [address]  Phone: [phone]  Fax: [phone]  Follow Up Time:    Bay Soto.  Surgery  [address], [address]  [place] [address]  Phone: [phone]  Fax: [phone]  Follow Up Time:    Bay Soto  Surgery  [address], [address]  [place] [address]  Phone: [phone]  Fax: [phone]  Follow Up Time:     Prem Eugene  Gastroenterology  [address], [address]  [place] [address]  Phone: [phone]  Fax: [phone]  Follow Up Time:    Bay Soto  Surgery  [address], [address]  [place] [address]  Phone: [phone]  Fax: [phone]  Follow Up Time:     Prem Eugene  Gastroenterology  [address], [address]  [place] [address]  Phone: [phone]  Fax: [phone]  Follow Up Time:

## 2023-12-08 NOTE — DISCHARGE NOTE NURSING/CASE MANAGEMENT/SOCIAL WORK - NSDCPEFALRISK_GEN_ALL_CORE
For information on Fall & Injury Prevention, visit: [email or web address] OR  [email or web address] OR  https://www.cdc.gov/steadi/patient.html For information on Fall & Injury Prevention, visit: [email or web address] OR  [email or web address] OR  https://www.cdc.gov/steadi/patient.html

## 2023-12-08 NOTE — PROGRESS NOTE ADULT - ASSESSMENT
23 M PMHx bl inguinal hernia repair now c/o right sided abd pain with NVD x 1-2 days .    # RUQ, RLQ pain and tenderness likely due to  Dilated appendix r/o early appendicitis  mild leukocytosis resolved  cont IV Zosyn for another 24 hr   currently on Full liquid diet  Surgery consult- no plans for surgery, advance diet   pain control    #Abn LFTs with RUQ/epigastric tenderness  CTAP neg for GB pathology  Check RUQ sono - as above    #Diarrhea  check stool cxs, GI pcr, check RVP negative    dispo-  23 M PMHx bl inguinal hernia repair now c/o right sided abd pain with NVD x 1-2 days .    # RUQ, RLQ Abd pain  r/o early appendicitis  -mild leukocytosis resolved  -cont IV Zosyn  -diet advanced today to Regular, if he tolerates can discharge home  -Surgery consulted- no plans for surgery, f/u in his office outpatient in 1 week    #Hepatic Steatosis  -elevated LFT's  -recommended low fat diet, no alcohol    #Diarrhea  -check stool cxs, GI pcr  -RVP neg    Dispo:  if patient tolerates diet can possibly D/C home later today, he will update his family 23 M PMHx bl inguinal hernia repair now c/o right sided abd pain with NVD x 1-2 days .    # RUQ, RLQ Abd pain  r/o early appendicitis  -mild leukocytosis resolved  -cont IV Zosyn for now  -diet advanced today to Regular, if he tolerates can discharge home  -Surgery consulted- no plans for surgery, f/u in his office outpatient in 1 week    #Hepatic Steatosis  -elevated LFT's  -recommended low fat diet, no alcohol    #Diarrhea  -check stool cxs, GI pcr  -RVP neg    Dispo:  if patient tolerates diet can possibly D/C home later today, he will update his family

## 2023-12-08 NOTE — DISCHARGE NOTE NURSING/CASE MANAGEMENT/SOCIAL WORK - PATIENT PORTAL LINK FT
You can access the FollowMyHealth Patient Portal offered by [facility] by registering at the following website: [email or web address]. By joining [employer]’s FollowMyHealth portal, you will also be able to view your health information using other applications (apps) compatible with our system. You can access the FollowMyHealth Patient Portal offered by [facility] by registering at the following website: [email or web address]. By joining [employer]’s FollowMyHealth portal, you will also be able to view your health information using other applications (apps) compatible with our system.

## 2023-12-08 NOTE — DISCHARGE NOTE PROVIDER - CARE PROVIDERS DIRECT ADDRESSES
[email or web address],DirectAddress_Unknown

## 2023-12-08 NOTE — PROGRESS NOTE ADULT - NS ATTEND AMEND GEN_ALL_CORE FT
Pt pain improved throughout morning. Diet advanced per surgery recs. If pt tolerates PO, possibly d/c later today. No abx on d/c. f/u outpatient 1 week.

## 2023-12-08 NOTE — PROGRESS NOTE ADULT - REASON FOR ADMISSION
RUQ pain, r/o appendicitis
RUQ pain, RLQ pain r/o appy

## 2023-12-08 NOTE — DISCHARGE NOTE PROVIDER - ATTENDING DISCHARGE PHYSICAL EXAMINATION:
PEx: sitting up in bed in NAD  HENT: NC/AT, MMM  NECK: supple, symmetric bilat  HEART: RRR, no murmurs  LUNGS: CTAB, no wheezes noted  Abd: soft, mild TTP R abd  MSK: moves all extremities, no edema to BLE  Psych: Appropriate affect, A&Ox4

## 2023-12-08 NOTE — DISCHARGE NOTE PROVIDER - NSDCCPCAREPLAN_GEN_ALL_CORE_FT
PRINCIPAL DISCHARGE DIAGNOSIS  Diagnosis: Abdominal pain  Assessment and Plan of Treatment:      PRINCIPAL DISCHARGE DIAGNOSIS  Diagnosis: Abdominal pain  Assessment and Plan of Treatment:   -you had a CAT scan and Abdominal Sonogram which did not show acute appendicitis, no Surgery recommended  -follow up with Dr Soto Surgeon as directed by him next week  -if you have severe abdominal pain, fevers, intractable nausea or vomiting come back to the ER   -no need for further antibiotics      SECONDARY DISCHARGE DIAGNOSES  Diagnosis: Hepatic steatosis  Assessment and Plan of Treatment:   -you have a fatty liver on CAT scan, follow up with a Gastroenterologist for further testing if needed  -avoid fatty foods and alcohol  -recommend repeat labs to check liver function enzymes within the next 2 weeks

## 2023-12-08 NOTE — PROGRESS NOTE ADULT - ASSESSMENT
Patient is a 23y old  Male who presents with a chief complaint of RUQ pain, RLQ pain r/o appy (08 Dec 2023 07:43)  surgery following for abdominal pain  no events noted overnight  pt denies n/v, +flatus/BM  tolerated clear diet  abdominal pain improving, c/o mild epigastric pain     pt doing well from surgical standpoint    plan  - if tolerates regular diet for lunch, may go home later today  - no need for further abx as per surgical team  - f/u with Dr. Soto early next week Mastoid Interpolation Flap Text: A decision was made to reconstruct the defect utilizing an interpolation axial flap and a staged reconstruction.  A telfa template was made of the defect.  This telfa template was then used to outline the mastoid interpolation flap.  The donor area for the pedicle flap was then injected with anesthesia.  The flap was excised through the skin and subcutaneous tissue down to the layer of the underlying musculature.  The pedicle flap was carefully excised within this deep plane to maintain its blood supply.  The edges of the donor site were undermined.   The donor site was closed in a primary fashion.  The pedicle was then rotated into position and sutured.  Once the tube was sutured into place, adequate blood supply was confirmed with blanching and refill.  The pedicle was then wrapped with xeroform gauze and dressed appropriately with a telfa and gauze bandage to ensure continued blood supply and protect the attached pedicle.

## 2023-12-08 NOTE — DISCHARGE NOTE PROVIDER - HOSPITAL COURSE
Hospital Course    23 M PMHx b/l inguinal hernia repair comes to ED c/o right sided abd pain with NVD x 1-2 days.  He was admitted to r/o early appendicitis.  He had mild leukocytosis which resolved.  He was started on empiric IV Zosyn. Surgery consulted- no plans for surgery, f/u in his office outpatient in 1 week.  He was found to have Hepatic Steatosis on imaging.  Also with elevated LFT's which did downtrend.  He was able to tolerate advanced diet and improved clinically.  D/C home and f/u outpatient with Surgery.    Discharging Provider:  SANTI Ballard  Contact Info: Cell [phone] - Please call with any questions or concerns.         Hospital Course    23 M PMHx b/l inguinal hernia repair comes to ED c/o right sided abd pain with NVD x 1-2 days.  He was admitted to r/o early appendicitis.  He had mild leukocytosis which resolved.  He was started on empiric IV Zosyn. Surgery consulted- no plans for surgery, f/u in his office outpatient in 1 week.  He was found to have Hepatic Steatosis on imaging.  Also with elevated LFT's which did downtrend.  He was able to tolerate advanced diet and improved clinically.  D/C home and f/u outpatient with Surgery.    Discharging Provider:  SANTI Ballard  Contact Info: Cell [phone] - Please call with any questions or concerns.         Hospital Course    23 M PMHx b/l inguinal hernia repair comes to ED c/o right sided abd pain with NVD x 1-2 days.  He was admitted to r/o early appendicitis.  He had mild leukocytosis which resolved.  He was started on empiric IV Zosyn. Surgery consulted- no plans for surgery, f/u in his office outpatient in 1 week.  He was found to have Hepatic Steatosis on imaging.  Also with elevated LFT's which did downtrend.  He was able to tolerate advanced diet and improved clinically.  D/C home and f/u outpatient with Surgery.    < from: US Abdomen Upper Quadrant Right (12.06.23 @ 08:29) >      IMPRESSION:  Hepatic steatosis.  No cholelithiasis or biliary ductal dilatation.    < end of copied text >    < from: CT Abdomen and Pelvis w/ IV Cont (12.06.23 @ 03:21) >      IMPRESSION:  No bowel obstruction or evidence of colitis.  Slightly prominent appendix up to 7 mm without associated periappendiceal   inflammation.    < end of copied text >          Discharging Provider:  SANTI Ballard  Contact Info: Cell [phone] - Please call with any questions or concerns.         Hospital Course    23 M PMHx b/l inguinal hernia repair comes to ED c/o right sided abd pain with NVD x 1-2 days.  He was admitted to r/o early appendicitis.  He had mild leukocytosis which resolved.  He was started on empiric IV Zosyn. Surgery consulted- no plans for surgery, f/u in his office outpatient in 1 week.  He was found to have Hepatic Steatosis on imaging.  Also with elevated LFT's which did downtrend.  He was able to tolerate advanced diet and improved clinically.  D/C home and f/u outpatient with Surgery.    < from: US Abdomen Upper Quadrant Right (12.06.23 @ 08:29) >      IMPRESSION:  Hepatic steatosis.  No cholelithiasis or biliary ductal dilatation.    < end of copied text >    < from: CT Abdomen and Pelvis w/ IV Cont (12.06.23 @ 03:21) >      IMPRESSION:  No bowel obstruction or evidence of colitis.  Slightly prominent appendix up to 7 mm without associated periappendiceal   inflammation.    < end of copied text >          Discharging Provider:  SANTI Ballard  Contact Info: Cell [phone] - Please call with any questions or concerns.

## 2023-12-08 NOTE — PROGRESS NOTE ADULT - SUBJECTIVE AND OBJECTIVE BOX
Patient is a 23y old  Male who presents with a chief complaint of RUQ pain, RLQ pain r/o appy (08 Dec 2023 07:43)  surgery following for abdominal pain  no events noted overnight  pt denies n/v, +flatus/BM  tolerated clear diet  abdominal pain improving, c/o mild epigastric pain     Patient seen and examined at bedside    ALLERGIES:  penicillins (Rash)  amoxicillin (Other)    MEDICATIONS  (STANDING):  influenza   Vaccine 0.5 milliLiter(s) IntraMuscular once  pantoprazole    Tablet 40 milliGRAM(s) Oral before breakfast  piperacillin/tazobactam IVPB.. 3.375 Gram(s) IV Intermittent every 8 hours    MEDICATIONS  (PRN):  acetaminophen     Tablet .. 650 milliGRAM(s) Oral every 6 hours PRN Mild Pain (1 - 3)  morphine  - Injectable 2 milliGRAM(s) IV Push every 6 hours PRN Severe Pain (7 - 10)  ondansetron Injectable 4 milliGRAM(s) IV Push every 8 hours PRN Nausea and/or Vomiting    Vital Signs Last 24 Hrs  T(F): 98 (07 Dec 2023 23:48), Max: 98.3 (07 Dec 2023 18:19)  HR: 67 (07 Dec 2023 23:48) (59 - 67)  BP: 125/73 (07 Dec 2023 23:48) (105/66 - 129/72)  RR: 16 (07 Dec 2023 23:48) (16 - 16)  SpO2: 97% (07 Dec 2023 23:48) (97% - 98%)    I&O's Summary    PHYSICAL EXAM:  General: NAD, A/O x 3, non toxic appearance  ENT: MMM  Neck: Supple, No JVD  Abdomen: soft, nd, nttp exept at epigastric area, mild ttp. no guarding   Extremities: No calf tenderness, No pitting edema    LABS:                        15.3   6.29  )-----------( 200      ( 07 Dec 2023 06:35 )             44.8     12-07    139  |  100  |  7   ----------------------------<  92  3.9   |  31  |  1.13    Ca    9.0      07 Dec 2023 06:35    TPro  7.5  /  Alb  3.6  /  TBili  1.1  /  DBili  0.3  /  AST  44  /  ALT  124  /  AlkPhos  80  12-07    Urinalysis Basic - ( 07 Dec 2023 06:35 )    Color: x / Appearance: x / SG: x / pH: x  Gluc: 92 mg/dL / Ketone: x  / Bili: x / Urobili: x   Blood: x / Protein: x / Nitrite: x   Leuk Esterase: x / RBC: x / WBC x   Sq Epi: x / Non Sq Epi: x / Bacteria: x

## 2023-12-08 NOTE — PROGRESS NOTE ADULT - SUBJECTIVE AND OBJECTIVE BOX
Patient is a 23y old  Male who presents with a chief complaint of RUQ pain, RLQ pain r/o appy (07 Dec 2023 21:15)      Patient seen and examined at bedside. No overnight events reported.     ALLERGIES:  penicillins (Rash)  amoxicillin (Other)    MEDICATIONS  (STANDING):  influenza   Vaccine 0.5 milliLiter(s) IntraMuscular once  pantoprazole    Tablet 40 milliGRAM(s) Oral before breakfast  piperacillin/tazobactam IVPB.. 3.375 Gram(s) IV Intermittent every 8 hours    MEDICATIONS  (PRN):  acetaminophen     Tablet .. 650 milliGRAM(s) Oral every 6 hours PRN Mild Pain (1 - 3)  morphine  - Injectable 2 milliGRAM(s) IV Push every 6 hours PRN Severe Pain (7 - 10)  ondansetron Injectable 4 milliGRAM(s) IV Push every 8 hours PRN Nausea and/or Vomiting    Vital Signs Last 24 Hrs  T(F): 98 (07 Dec 2023 23:48), Max: 98.3 (07 Dec 2023 18:19)  HR: 67 (07 Dec 2023 23:48) (59 - 67)  BP: 125/73 (07 Dec 2023 23:48) (105/66 - 129/72)  RR: 16 (07 Dec 2023 23:48) (16 - 16)  SpO2: 97% (07 Dec 2023 23:48) (97% - 98%)  I&O's Summary    PHYSICAL EXAM:  General: NAD, A/O x 3  ENT: No gross hearing impairment, Moist mucous membranes, no thrush  Neck: Supple, No JVD  Lungs: Clear to auscultation bilaterally, good air entry, non-labored breathing  Cardio: RRR, S1/S2, No murmur  Abdomen: Soft, Nontender, Nondistended; Bowel sounds present  Extremities: No calf tenderness, No cyanosis, No pitting edema  Psych: Appropriate mood and affect    LABS:                        15.3   6.29  )-----------( 200      ( 07 Dec 2023 06:35 )             44.8     12-07    139  |  100  |  7   ----------------------------<  92  3.9   |  31  |  1.13    Ca    9.0      07 Dec 2023 06:35    TPro  7.5  /  Alb  3.6  /  TBili  1.1  /  DBili  0.3  /  AST  44  /  ALT  124  /  AlkPhos  80  12-07      Lipase: 29 U/L (12-07-23 @ 06:35)  Lipase: 25 U/L (12-06-23 @ 01:30)                                  Urinalysis Basic - ( 07 Dec 2023 06:35 )    Color: x / Appearance: x / SG: x / pH: x  Gluc: 92 mg/dL / Ketone: x  / Bili: x / Urobili: x   Blood: x / Protein: x / Nitrite: x   Leuk Esterase: x / RBC: x / WBC x   Sq Epi: x / Non Sq Epi: x / Bacteria: x          RADIOLOGY & ADDITIONAL TESTS:  < from: US Abdomen Upper Quadrant Right (12.06.23 @ 08:29) >    IMPRESSION:  Hepatic steatosis.  No cholelithiasis or biliary ductal dilatation.        < end of copied text >  < from: CT Abdomen and Pelvis w/ IV Cont (12.06.23 @ 03:21) >    IMPRESSION:  No bowel obstruction or evidence of colitis.  Slightly prominent appendix up to 7 mm without associated periappendiceal   inflammation.      < end of copied text >    Care Discussed with Consultants/Other Providers:    Patient is a 23y old  Male who presents with a chief complaint of RUQ pain, RLQ pain r/o appy (07 Dec 2023 21:15)      Patient seen and examined at bedside. No overnight events reported.   He has intermittent abd pain, no N/V.    ALLERGIES:  penicillins (Rash)  amoxicillin (Other)    MEDICATIONS  (STANDING):  influenza   Vaccine 0.5 milliLiter(s) IntraMuscular once  pantoprazole    Tablet 40 milliGRAM(s) Oral before breakfast  piperacillin/tazobactam IVPB.. 3.375 Gram(s) IV Intermittent every 8 hours    MEDICATIONS  (PRN):  acetaminophen     Tablet .. 650 milliGRAM(s) Oral every 6 hours PRN Mild Pain (1 - 3)  morphine  - Injectable 2 milliGRAM(s) IV Push every 6 hours PRN Severe Pain (7 - 10)  ondansetron Injectable 4 milliGRAM(s) IV Push every 8 hours PRN Nausea and/or Vomiting    Vital Signs Last 24 Hrs  T(F): 98 (07 Dec 2023 23:48), Max: 98.3 (07 Dec 2023 18:19)  HR: 67 (07 Dec 2023 23:48) (59 - 67)  BP: 125/73 (07 Dec 2023 23:48) (105/66 - 129/72)  RR: 16 (07 Dec 2023 23:48) (16 - 16)  SpO2: 97% (07 Dec 2023 23:48) (97% - 98%)  I&O's Summary    PHYSICAL EXAM:  General: NAD, A/O x 3  ENT: No gross hearing impairment, Moist mucous membranes, no thrush  Neck: Supple, No JVD  Lungs: Clear to auscultation bilaterally, good air entry, non-labored breathing  Cardio: RRR, S1/S2, No murmur  Abdomen: Soft, mildly tender to deep palpation diffusely, Nondistended; Bowel sounds present  Extremities: No calf tenderness, No cyanosis, No pitting edema  Psych: Appropriate mood and affect    LABS:                        15.3   6.29  )-----------( 200      ( 07 Dec 2023 06:35 )             44.8     12-07    139  |  100  |  7   ----------------------------<  92  3.9   |  31  |  1.13    Ca    9.0      07 Dec 2023 06:35    TPro  7.5  /  Alb  3.6  /  TBili  1.1  /  DBili  0.3  /  AST  44  /  ALT  124  /  AlkPhos  80  12-07      Lipase: 29 U/L (12-07-23 @ 06:35)  Lipase: 25 U/L (12-06-23 @ 01:30)                                  Urinalysis Basic - ( 07 Dec 2023 06:35 )    Color: x / Appearance: x / SG: x / pH: x  Gluc: 92 mg/dL / Ketone: x  / Bili: x / Urobili: x   Blood: x / Protein: x / Nitrite: x   Leuk Esterase: x / RBC: x / WBC x   Sq Epi: x / Non Sq Epi: x / Bacteria: x          RADIOLOGY & ADDITIONAL TESTS:  < from: US Abdomen Upper Quadrant Right (12.06.23 @ 08:29) >    IMPRESSION:  Hepatic steatosis.  No cholelithiasis or biliary ductal dilatation.        < end of copied text >  < from: CT Abdomen and Pelvis w/ IV Cont (12.06.23 @ 03:21) >    IMPRESSION:  No bowel obstruction or evidence of colitis.  Slightly prominent appendix up to 7 mm without associated periappendiceal   inflammation.      < end of copied text >    Care Discussed with Consultants/Other Providers:    Patient is a 23y old  Male who presents with a chief complaint of RUQ pain, RLQ pain r/o appy (07 Dec 2023 21:15)      Patient seen and examined at bedside. No overnight events reported.   He has intermittent abd pain, no N/V.    ALLERGIES:  penicillins (Rash)  amoxicillin (Other)    MEDICATIONS  (STANDING):  influenza   Vaccine 0.5 milliLiter(s) IntraMuscular once  pantoprazole    Tablet 40 milliGRAM(s) Oral before breakfast  piperacillin/tazobactam IVPB.. 3.375 Gram(s) IV Intermittent every 8 hours    MEDICATIONS  (PRN):  acetaminophen     Tablet .. 650 milliGRAM(s) Oral every 6 hours PRN Mild Pain (1 - 3)  morphine  - Injectable 2 milliGRAM(s) IV Push every 6 hours PRN Severe Pain (7 - 10)  ondansetron Injectable 4 milliGRAM(s) IV Push every 8 hours PRN Nausea and/or Vomiting    Vital Signs Last 24 Hrs  T(F): 98 (07 Dec 2023 23:48), Max: 98.3 (07 Dec 2023 18:19)  HR: 67 (07 Dec 2023 23:48) (59 - 67)  BP: 125/73 (07 Dec 2023 23:48) (105/66 - 129/72)  RR: 16 (07 Dec 2023 23:48) (16 - 16)  SpO2: 97% (07 Dec 2023 23:48) (97% - 98%)  I&O's Summary    PHYSICAL EXAM:  General: NAD, A/O x 3  ENT: No gross hearing impairment, Moist mucous membranes, no thrush  Neck: Supple, No JVD  Lungs: Clear to auscultation bilaterally, good air entry, non-labored breathing  Cardio: RRR, S1/S2, No murmur  Abdomen: Soft, mildly tender to deep palpation diffusely, Nondistended; Bowel sounds present  Extremities: No calf tenderness, No cyanosis, No pitting edema  Psych: Appropriate mood and affect    LABS:                        15.3   6.29  )-----------( 200      ( 07 Dec 2023 06:35 )             44.8     12-07    139  |  100  |  7   ----------------------------<  92  3.9   |  31  |  1.13    Ca    9.0      07 Dec 2023 06:35    TPro  7.5  /  Alb  3.6  /  TBili  1.1  /  DBili  0.3  /  AST  44  /  ALT  124  /  AlkPhos  80  12-07      Lipase: 29 U/L (12-07-23 @ 06:35)  Lipase: 25 U/L (12-06-23 @ 01:30)                                  Urinalysis Basic - ( 07 Dec 2023 06:35 )    Color: x / Appearance: x / SG: x / pH: x  Gluc: 92 mg/dL / Ketone: x  / Bili: x / Urobili: x   Blood: x / Protein: x / Nitrite: x   Leuk Esterase: x / RBC: x / WBC x   Sq Epi: x / Non Sq Epi: x / Bacteria: x          RADIOLOGY & ADDITIONAL TESTS:  < from: US Abdomen Upper Quadrant Right (12.06.23 @ 08:29) >    IMPRESSION:  Hepatic steatosis.  No cholelithiasis or biliary ductal dilatation.        < end of copied text >  < from: CT Abdomen and Pelvis w/ IV Cont (12.06.23 @ 03:21) >    IMPRESSION:  No bowel obstruction or evidence of colitis.  Slightly prominent appendix up to 7 mm without associated periappendiceal   inflammation.      < end of copied text >    Care Discussed with Consultants/Other Providers:   yes - with surgery team

## 2025-04-16 ENCOUNTER — EMERGENCY (EMERGENCY)
Facility: HOSPITAL | Age: 25
LOS: 1 days | End: 2025-04-16
Attending: EMERGENCY MEDICINE | Admitting: EMERGENCY MEDICINE
Payer: MEDICAID

## 2025-04-16 VITALS
OXYGEN SATURATION: 98 % | DIASTOLIC BLOOD PRESSURE: 73 MMHG | SYSTOLIC BLOOD PRESSURE: 111 MMHG | RESPIRATION RATE: 16 BRPM | HEART RATE: 78 BPM

## 2025-04-16 VITALS
HEART RATE: 80 BPM | OXYGEN SATURATION: 97 % | TEMPERATURE: 98 F | WEIGHT: 199.96 LBS | HEIGHT: 67 IN | SYSTOLIC BLOOD PRESSURE: 113 MMHG | DIASTOLIC BLOOD PRESSURE: 67 MMHG | RESPIRATION RATE: 18 BRPM

## 2025-04-16 LAB
ALBUMIN SERPL ELPH-MCNC: 3.9 G/DL — SIGNIFICANT CHANGE UP (ref 3.3–5)
ALP SERPL-CCNC: 97 U/L — SIGNIFICANT CHANGE UP (ref 40–120)
ALT FLD-CCNC: 116 U/L — HIGH (ref 10–45)
ANION GAP SERPL CALC-SCNC: 8 MMOL/L — SIGNIFICANT CHANGE UP (ref 5–17)
APPEARANCE UR: CLEAR — SIGNIFICANT CHANGE UP
APTT BLD: 30.2 SEC — SIGNIFICANT CHANGE UP (ref 24.5–35.6)
AST SERPL-CCNC: 49 U/L — HIGH (ref 10–40)
BASOPHILS # BLD AUTO: 0.06 K/UL — SIGNIFICANT CHANGE UP (ref 0–0.2)
BASOPHILS NFR BLD AUTO: 0.6 % — SIGNIFICANT CHANGE UP (ref 0–2)
BILIRUB SERPL-MCNC: 0.7 MG/DL — SIGNIFICANT CHANGE UP (ref 0.2–1.2)
BILIRUB UR-MCNC: NEGATIVE — SIGNIFICANT CHANGE UP
BUN SERPL-MCNC: 15 MG/DL — SIGNIFICANT CHANGE UP (ref 7–23)
CALCIUM SERPL-MCNC: 9.3 MG/DL — SIGNIFICANT CHANGE UP (ref 8.4–10.5)
CHLORIDE SERPL-SCNC: 102 MMOL/L — SIGNIFICANT CHANGE UP (ref 96–108)
CO2 SERPL-SCNC: 29 MMOL/L — SIGNIFICANT CHANGE UP (ref 22–31)
COLOR SPEC: YELLOW — SIGNIFICANT CHANGE UP
CREAT SERPL-MCNC: 0.79 MG/DL — SIGNIFICANT CHANGE UP (ref 0.5–1.3)
D DIMER BLD IA.RAPID-MCNC: <150 NG/ML DDU — SIGNIFICANT CHANGE UP
DIFF PNL FLD: NEGATIVE — SIGNIFICANT CHANGE UP
EGFR: 129 ML/MIN/1.73M2 — SIGNIFICANT CHANGE UP
EGFR: 129 ML/MIN/1.73M2 — SIGNIFICANT CHANGE UP
EOSINOPHIL # BLD AUTO: 0.19 K/UL — SIGNIFICANT CHANGE UP (ref 0–0.5)
EOSINOPHIL NFR BLD AUTO: 1.9 % — SIGNIFICANT CHANGE UP (ref 0–6)
FLUAV AG NPH QL: SIGNIFICANT CHANGE UP
FLUBV AG NPH QL: SIGNIFICANT CHANGE UP
GLUCOSE SERPL-MCNC: 103 MG/DL — HIGH (ref 70–99)
GLUCOSE UR QL: NEGATIVE MG/DL — SIGNIFICANT CHANGE UP
HCT VFR BLD CALC: 46.5 % — SIGNIFICANT CHANGE UP (ref 39–50)
HGB BLD-MCNC: 16.3 G/DL — SIGNIFICANT CHANGE UP (ref 13–17)
IMM GRANULOCYTES NFR BLD AUTO: 0.5 % — SIGNIFICANT CHANGE UP (ref 0–0.9)
INR BLD: 0.96 RATIO — SIGNIFICANT CHANGE UP (ref 0.85–1.16)
KETONES UR-MCNC: ABNORMAL MG/DL
LEUKOCYTE ESTERASE UR-ACNC: NEGATIVE — SIGNIFICANT CHANGE UP
LIDOCAIN IGE QN: 27 U/L — SIGNIFICANT CHANGE UP (ref 16–77)
LYMPHOCYTES # BLD AUTO: 2.41 K/UL — SIGNIFICANT CHANGE UP (ref 1–3.3)
LYMPHOCYTES # BLD AUTO: 24.1 % — SIGNIFICANT CHANGE UP (ref 13–44)
MCHC RBC-ENTMCNC: 29.4 PG — SIGNIFICANT CHANGE UP (ref 27–34)
MCHC RBC-ENTMCNC: 35.1 G/DL — SIGNIFICANT CHANGE UP (ref 32–36)
MCV RBC AUTO: 83.9 FL — SIGNIFICANT CHANGE UP (ref 80–100)
MONOCYTES # BLD AUTO: 0.72 K/UL — SIGNIFICANT CHANGE UP (ref 0–0.9)
MONOCYTES NFR BLD AUTO: 7.2 % — SIGNIFICANT CHANGE UP (ref 2–14)
NEUTROPHILS # BLD AUTO: 6.57 K/UL — SIGNIFICANT CHANGE UP (ref 1.8–7.4)
NEUTROPHILS NFR BLD AUTO: 65.7 % — SIGNIFICANT CHANGE UP (ref 43–77)
NITRITE UR-MCNC: NEGATIVE — SIGNIFICANT CHANGE UP
NRBC BLD AUTO-RTO: 0 /100 WBCS — SIGNIFICANT CHANGE UP (ref 0–0)
PH UR: 7.5 — SIGNIFICANT CHANGE UP (ref 5–8)
PLATELET # BLD AUTO: 288 K/UL — SIGNIFICANT CHANGE UP (ref 150–400)
POTASSIUM SERPL-MCNC: 4.1 MMOL/L — SIGNIFICANT CHANGE UP (ref 3.5–5.3)
POTASSIUM SERPL-SCNC: 4.1 MMOL/L — SIGNIFICANT CHANGE UP (ref 3.5–5.3)
PROT SERPL-MCNC: 8.1 G/DL — SIGNIFICANT CHANGE UP (ref 6–8.3)
PROT UR-MCNC: NEGATIVE MG/DL — SIGNIFICANT CHANGE UP
PROTHROM AB SERPL-ACNC: 11.3 SEC — SIGNIFICANT CHANGE UP (ref 9.9–13.4)
RBC # BLD: 5.54 M/UL — SIGNIFICANT CHANGE UP (ref 4.2–5.8)
RBC # FLD: 12.5 % — SIGNIFICANT CHANGE UP (ref 10.3–14.5)
RSV RNA NPH QL NAA+NON-PROBE: SIGNIFICANT CHANGE UP
SARS-COV-2 RNA SPEC QL NAA+PROBE: SIGNIFICANT CHANGE UP
SODIUM SERPL-SCNC: 139 MMOL/L — SIGNIFICANT CHANGE UP (ref 135–145)
SOURCE RESPIRATORY: SIGNIFICANT CHANGE UP
SP GR SPEC: 1.02 — SIGNIFICANT CHANGE UP (ref 1–1.03)
UROBILINOGEN FLD QL: 2 MG/DL (ref 0.2–1)
WBC # BLD: 10 K/UL — SIGNIFICANT CHANGE UP (ref 3.8–10.5)
WBC # FLD AUTO: 10 K/UL — SIGNIFICANT CHANGE UP (ref 3.8–10.5)

## 2025-04-16 PROCEDURE — 99284 EMERGENCY DEPT VISIT MOD MDM: CPT | Mod: 25

## 2025-04-16 PROCEDURE — 71045 X-RAY EXAM CHEST 1 VIEW: CPT | Mod: 26

## 2025-04-16 PROCEDURE — 85610 PROTHROMBIN TIME: CPT

## 2025-04-16 PROCEDURE — 85025 COMPLETE CBC W/AUTO DIFF WBC: CPT

## 2025-04-16 PROCEDURE — 36415 COLL VENOUS BLD VENIPUNCTURE: CPT

## 2025-04-16 PROCEDURE — 99284 EMERGENCY DEPT VISIT MOD MDM: CPT

## 2025-04-16 PROCEDURE — 81003 URINALYSIS AUTO W/O SCOPE: CPT

## 2025-04-16 PROCEDURE — 87637 SARSCOV2&INF A&B&RSV AMP PRB: CPT

## 2025-04-16 PROCEDURE — 80053 COMPREHEN METABOLIC PANEL: CPT

## 2025-04-16 PROCEDURE — 83690 ASSAY OF LIPASE: CPT

## 2025-04-16 PROCEDURE — 85379 FIBRIN DEGRADATION QUANT: CPT

## 2025-04-16 PROCEDURE — 71045 X-RAY EXAM CHEST 1 VIEW: CPT

## 2025-04-16 PROCEDURE — 85730 THROMBOPLASTIN TIME PARTIAL: CPT

## 2025-04-16 PROCEDURE — 96374 THER/PROPH/DIAG INJ IV PUSH: CPT

## 2025-04-16 RX ORDER — OLOPATADINE HYDROCHLORIDE 1 MG/ML
1 SOLUTION OPHTHALMIC
Qty: 1 | Refills: 0
Start: 2025-04-16 | End: 2025-04-22

## 2025-04-16 RX ORDER — ACETAMINOPHEN 500 MG/5ML
1000 LIQUID (ML) ORAL ONCE
Refills: 0 | Status: COMPLETED | OUTPATIENT
Start: 2025-04-16 | End: 2025-04-16

## 2025-04-16 RX ADMIN — Medication 1000 MILLIGRAM(S): at 17:01

## 2025-04-16 RX ADMIN — Medication 2000 MILLILITER(S): at 16:31

## 2025-04-16 RX ADMIN — Medication 400 MILLIGRAM(S): at 16:31

## 2025-04-17 ENCOUNTER — EMERGENCY (EMERGENCY)
Facility: HOSPITAL | Age: 25
LOS: 1 days | End: 2025-04-17
Attending: EMERGENCY MEDICINE | Admitting: EMERGENCY MEDICINE
Payer: SELF-PAY

## 2025-04-17 VITALS
OXYGEN SATURATION: 100 % | WEIGHT: 199.96 LBS | SYSTOLIC BLOOD PRESSURE: 132 MMHG | HEIGHT: 67 IN | RESPIRATION RATE: 18 BRPM | HEART RATE: 72 BPM | TEMPERATURE: 98 F | DIASTOLIC BLOOD PRESSURE: 81 MMHG

## 2025-04-17 PROCEDURE — 99284 EMERGENCY DEPT VISIT MOD MDM: CPT

## 2025-04-17 PROCEDURE — 99283 EMERGENCY DEPT VISIT LOW MDM: CPT

## 2025-04-17 RX ORDER — IBUPROFEN 200 MG
600 TABLET ORAL ONCE
Refills: 0 | Status: COMPLETED | OUTPATIENT
Start: 2025-04-17 | End: 2025-04-17

## 2025-04-17 RX ADMIN — Medication 600 MILLIGRAM(S): at 16:43

## 2025-04-17 NOTE — ED PROVIDER NOTE - IV ALTEPLASE DOOR HIDDEN
Muscle Hinge Flap Text: The defect edges were debeveled with a #15 scalpel blade.  Given the size, depth and location of the defect and the proximity to free margins a muscle hinge flap was deemed most appropriate.  Using a sterile surgical marker, an appropriate hinge flap was drawn incorporating the defect. The area thus outlined was incised with a #15 scalpel blade.  The skin margins were undermined to an appropriate distance in all directions utilizing iris scissors. show
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
## 2025-04-17 NOTE — ED PROVIDER NOTE - PATIENT PORTAL LINK FT
You can access the FollowMyHealth Patient Portal offered by [facility] by registering at the following website: [email or web address]. By joining [email or web address]’s FollowMyHealth portal, you will also be able to view your health information using other applications (apps) compatible with our system.

## 2025-04-17 NOTE — ED PROVIDER NOTE - NSICDXPASTSURGICALHX_GEN_ALL_CORE_FT
PAST SURGICAL HISTORY:  Circumcision     Inguinal hernia bilateral, non-recurrent repaired at 3 mos of life @ [facility]

## 2025-04-17 NOTE — ED ADULT NURSE NOTE - NSICDXPASTSURGICALHX_GEN_ALL_CORE_FT
PAST SURGICAL HISTORY:  Circumcision     Inguinal hernia bilateral, non-recurrent repaired at 3 mos of life @ [facility]

## 2025-04-17 NOTE — ED PROVIDER NOTE - SKIN WOUND TYPE
About 2 cm centimeter by 3 cm minimal light bruising noted in the left lower quadrant.  No guarding no rebound, no tenderness/BRUSING

## 2025-09-05 ENCOUNTER — NON-APPOINTMENT (OUTPATIENT)
Age: 25
End: 2025-09-05